# Patient Record
Sex: MALE | Race: WHITE | Employment: OTHER | URBAN - METROPOLITAN AREA
[De-identification: names, ages, dates, MRNs, and addresses within clinical notes are randomized per-mention and may not be internally consistent; named-entity substitution may affect disease eponyms.]

---

## 2022-10-26 ENCOUNTER — HOSPITAL ENCOUNTER (OUTPATIENT)
Dept: PHYSICAL THERAPY | Age: 82
Setting detail: THERAPIES SERIES
Discharge: HOME | End: 2022-10-26
Payer: MEDICARE

## 2022-10-26 DIAGNOSIS — R29.898 DECREASED STRENGTH OF LOWER EXTREMITY: ICD-10-CM

## 2022-10-26 DIAGNOSIS — M25.661 DECREASED RANGE OF MOTION OF BOTH KNEES: Primary | ICD-10-CM

## 2022-10-26 DIAGNOSIS — M25.461 EFFUSION OF BOTH KNEE JOINTS: ICD-10-CM

## 2022-10-26 DIAGNOSIS — M25.662 DECREASED RANGE OF MOTION OF BOTH KNEES: Primary | ICD-10-CM

## 2022-10-26 DIAGNOSIS — M25.462 EFFUSION OF BOTH KNEE JOINTS: ICD-10-CM

## 2022-10-26 PROCEDURE — 97530 THERAPEUTIC ACTIVITIES: CPT

## 2022-10-26 PROCEDURE — 97161 PT EVAL LOW COMPLEX 20 MIN: CPT

## 2022-10-26 NOTE — PLAN OF CARE
[address] [address], [address] [place] [address]  Phone: [phone]   Fax: [phone]                                                       Physical Therapy Certification    Dear Kim Aldana DO  ,    We had the pleasure of evaluating the following patient for physical therapy services at [address] [place] [address]. A summary of our findings can be found in the initial assessment below. This includes our plan of care. If you have any questions or concerns regarding these findings, please do not hesitate to contact me at the office phone number checked above. Thank you for the referral.       Physician Signature:_______________________________Date:__________________  By signing above (or electronic signature), therapists plan is approved by physician      Patient: mEilie Johnson   : 1940   MRN: [id number]  Referring Physician: Kim Aldana DO        Evaluation Date: 10/26/2022      Medical Diagnosis Information:  Primary OA Bilateral Knees M17. 0  No admission diagnoses are documented for this encounter. PT diagnosis:     ICD-10-CM    1. Decreased range of motion of both knees  M25.661     M25.662       2. Decreased strength of lower extremity  R29.898       3. Effusion of both knee joints  M25.461     M25.462                                              Insurance information: PT Insurance Information: [employer]     Precautions/ Contra-indications: None  Latex Allergy:  [x]NO      []YES  Preferred Language for Healthcare:   [x]English       []Other:    C-SSRS Triggered by Intake questionnaire (Past 2 wk assessment ):   [x] No, Questionnaire did not trigger screening.   [] Yes, Patient intake triggered C-SSRS Screening     [] Completed, no further action required.    [] Completed, PCP notified via Epic    SUBJECTIVE: Patient stated complaint: Pt will be getting Greg TKA November 15th with Dr. Kamar Brunner. Right pain worse than left. Having trouble with stairs, ladders, cycling. Reports cycling does not bother his knee, usually helps. Pain will wake him up in the middle of the night. Reports if he does not bike for a week his knee is in much more pain. TKA scheduled for November 15th    Relevant Medical History:Gel shots in knees. Functional Outcome: FOTO physical FS primary measure score = 44; Risk adjusted = 60    Pain Scale: 0-7/10  Easing factors: Cycling, maloxicam, volteran, Ice, tylenol. Provocative factors: Walking, stairs, sit to stand, ladders     Type: [] Constant   [x]Intermittent  []Radiating []Localized []other:     Numbness/Tingling: None    Occupation/School: Retired, [employer]    Living Status: Will you live with someone who can care for you after surgery? Yes, spouse and kids will visit right after surgery. Where is the bathroom located in your post-surgery place of residence? first   Where is the bedroom located in your post-surgery place of residence? first   Do you use community supports (home help, meals-on-wheels, district nurse)? [] 0-1 x/wk []  2+ x/wk   How may stairs will you have to climb to get in to your place of residence? 2   Have you had a fall in the past year? no    Prior Level of Function:Prior to this injury / incident, pt was independent with ADLs and IADLs,    Do you use ambulatory aids? How far on average can you walk? [x] 2 blocks+  [] 1-2 blocks  [] mostly house bound   What is you physical activity level? [x] highly active  []  Active  [] somewhat active  [] sedentary      OBJECTIVE:   Palpation: Joint effusion anterolateral right knee. Quad: Strong palpable contraction, sustained. Functional Mobility/Transfers: Squatting with decreased posterior weight shift    Posture: no deficits.      Bandages/Dressings/Incisions: None    Gait: (include devices/WB status) decreased terminal extension    Dermatomes Normal Abnormal Comments   inguinal area (L1)  x     anterior mid-thigh (L2) x     distal ant thigh/med knee (L3) x     medial lower leg and foot (L4) x     lateral lower leg and foot (L5) x     posterior calf (S1) x     medial calcaneus (S2) x         Myotomes Normal Abnormal Comments   Hip flexion (L1-L2) x     Knee extension (L2-L4) x     Dorsiflexion (L4-L5) x     Great Toe Ext (L5) x     Ankle Eversion (S1-S2) x     Ankle PF(S1-S2) x         Reflexes Normal Abnormal Comments   S1-2 Seated achilles      S1-2 Prone knee bend      L3-4 Patellar tendon x     Clonus      Babinski           PROM AROM    L R L R   Knee Flexion 125 117 122 115   Knee Extension   0-2 -1-0       Strength (0-5) Left Right   Hip Flexion - supine 5 5   Hip Flexion - seated     Hip Abduction - sidelyling 25.5# 28.2#   Hip Adduction 5 5   Hip Extension 5 5   Quads 58.8# 43.8#   Hamstrings 4 4        Flexibility     Hamstrings (90/90)     ITB (Reddy)     Quads (Ely's)     Hip Flexor Lizzeth Lenny)          Girth     Mid patella 37 40   Suprapatellar 37.5 41       Joint mobility: patellofemoral Superioinferior right   []Normal    [x]Hypo   []Hyper           Functional Testing  Sx Date 10/26/2022 Prehab Date 10/26/2022 Post-op Re-Eval Date  4 week F/U date  8 week F/U date  D/C Date      TUG (sec) 7.12       30 second sit to stand (reps) 15       6 minute walk (m) NT          Balance:    Narrowed MELISSA (sec) 10+       Semi Tandem (sec) 10+       Tandem (sec) 10+       SLS (sec) Left 10+, Right 7s          Knee AROM L R L R L R L R L R   Flexion 122 115           Extension 0-2 -1-0              Knee Ext: L R L R L R L R L R   MMT (of 5) 5 5           Knee Ext (#) 58.8 43.8              Hip Abd:  L R L R L R L R L R   MMT (of 5) 4+ 4+           Hip Abd (#) 25.5 28.2              LEFS (raw)  crosswalk 28.4                              [x] Patient history, allergies, meds reviewed. Medical chart reviewed. See intake form.      Review Of Systems (ROS):  [x]Performed Review of systems (Integumentary, CardioPulmonary, Neurological) by intake and observation. Intake form has been scanned into medical record. Patient has been instructed to contact their primary care physician regarding ROS issues if not already being addressed at this time.     Co-morbidities/Complexities (which will affect course of rehabilitation):   []None        []Hx of COVID   Arthritic conditions   []Rheumatoid arthritis (M05.9)  [x]Osteoarthritis (M19.91)  []Gout   Cardiovascular conditions   []Hypertension (I10)  []Hyperlipidemia (E78.5)  []Angina pectoris (I20)  []Atherosclerosis (I70)  []Pacemaker  []Hx of CABG/stent/  cardiac surgeries   Musculoskeletal conditions   []Disc pathology   []Congenital spine pathologies   []Osteoporosis (M81.8)  []Osteopenia (M85.8)  []Scoliosis       Endocrine conditions   []Hypothyroid (E03.9)  []Hyperthyroid Gastrointestinal conditions   []Constipation (U63.53)   Metabolic conditions   []Morbid obesity (E66.01)  []Diabetes type 1(E10.65) or 2 (E11.65)   []Neuropathy (G60.9)     Cardio/Pulmonary conditions   []Asthma (J45)  []Coughing   []COPD (J44.9)  []CHF  []A-fib   Psychological Disorders  []Anxiety (F41.9)  []Depression (F32.9)   []Other:   Developmental Disorders  []Autism (F84.0)  []CP (G80)  []Down Syndrome (Q90.9)  []Developmental delay     Neurological conditions  []Prior Stroke (I69.30)  []Parkinson's (G20)  []Encephalopathy (G93.40)  []MS (G35)  []Post-polio (G14)  []SCI  []TBI  []ALS Other conditions  []Fibromyalgia (M79.7)  []Vertigo  []Syncope  []Kidney Failure  []Cancer      []currently undergoing                treatment  []Pregnancy  []Incontinence   Prior surgeries  []involved limb  []previous spinal surgery  [] section birth  []hysterectomy  []bowel / bladder surgery  []other relevant surgeries   []Other:              Barriers to/and or personal factors that will affect rehab potential:              [x]Age  []Sex    []Smoker              []Motivation/Lack of Motivation                        []Co-Morbidities              []Cognitive Function, education/learning barriers              []Environmental, home barriers              []profession/work barriers  []past PT/medical experience  []other:  Justification:    Falls Risk Assessment (30 days):   [x] Falls Risk assessed and no intervention required. [] Falls Risk assessed and Patient requires intervention due to being higher risk   TUG score (>12s at risk):     [] Falls education provided, including       ASSESSMENT:Eric is an 80 y/o Male who presents to the clinic with PMH included bilateral knee osteoarthritis and for prehab evaluation (Greg TKA). Surgical procedure planned for November 15th. The patient complains of limitations including pain with prolonged walking (>1 mile), climbing ladder. Physical therapy evaluation revealed limitations in knee flexion range of motion bilaterally, joint effusion right, proximal/posterolateral hip weakness, inferior PF hypomobility that are limiting the patients ability to function in ADLs, further limiting their ability to participate in household and ADLs. Overall, physical therapy evaluation is consistent with referring diagnosis. The patient will benefit from skilled physical therapy interventions including progressive strengthening and HEP for prehab evaluation in order to improve functional and participation restrictions. This session focused on assessing outcomes pre-op, educating on post operative HEP and expectations. . Plan to continue PT for roughly 3 visits preoperatively for global strengthening. .     Functional Impairments:     []Noted lumbar/proximal hip/LE joint hypomobility   [x]Decreased LE functional ROM   [x]Decreased core/proximal hip strength and neuromuscular control   [x]Decreased LE functional strength   []Reduced balance/proprioceptive control   []other:      Functional Activity Limitations (from functional questionnaire and intake)   []Reduced ability to tolerate prolonged functional positions   []Reduced ability or difficulty with changes of positions or transfers between positions   []Reduced ability to maintain good posture and demonstrate good body mechanics with sitting, bending, and lifting   [x]Reduced ability to sleep   [] Reduced ability or tolerance with driving and/or computer work   []Reduced ability to perform lifting, carrying tasks   []Reduced ability to squat   []Reduced ability to forward bend   [x]Reduced ability to ambulate prolonged functional periods/distances/surfaces   [x]Reduced ability to ascend/descend stairs   []Reduced ability to run, hop, cut or jump   []other:    Participation Restrictions   [x]Reduced participation in self care activities   [x]Reduced participation in home management activities   []Reduced participation in work activities   []Reduced participation in social activities. []Reduced participation in sport/recreation activities. Classification :    [x]Signs/symptoms consistent with post-surgical status including decreased ROM, strength and function.    []Signs/symptoms consistent with joint sprain/strain   []Signs/symptoms consistent with patella-femoral syndrome   []Signs/symptoms consistent with knee OA/hip OA   []Signs/symptoms consistent with internal derangement of knee/Hip   []Signs/symptoms consistent with functional hip weakness/NMR control      []Signs/symptoms consistent with tendinitis/tendinosis    []signs/symptoms consistent with pathology which may benefit from Dry needling      []other:      Prognosis/Rehab Potential:      []Excellent   [x]Good    []Fair   []Poor    Tolerance of evaluation/treatment:    [x]Excellent   []Good    []Fair   []Poor    Physical Therapy Evaluation Complexity Justification  [x] A history of present problem with:  [] no personal factors and/or comorbidities that impact the plan of care;  [x]1-2 personal factors and/or comorbidities that impact the plan of care  []3 personal factors and/or comorbidities that impact the plan of care  [x] An examination of body systems using standardized tests and measures addressing any of the following: body structures and functions (impairments), activity limitations, and/or participation restrictions;:  [x] a total of 1-2 or more elements   [] a total of 3 or more elements   [] a total of 4 or more elements   [x] A clinical presentation with:  [x] stable and/or uncomplicated characteristics   [] evolving clinical presentation with changing characteristics  [] unstable and unpredictable characteristics;   [x] Clinical decision making of [x] low , [] moderate, [] high complexity using standardized patient assessment instrument and/or measurable assessment of functional outcome. [x] EVAL (LOW) 48637 (typically 30 minutes face-to-face)  [] EVAL (MOD) 37205 (typically 30 minutes face-to-face)  [] EVAL (HIGH) 70275 (typically 45 minutes face-to-face)  [] RE-EVAL     PLAN:   Frequency/Duration:  1-2 days per week for 3 Weeks Pre-op:  Interventions:  [x]  Therapeutic exercise including: strength training, ROM, for Lower extremity and core   [x]  NMR activation and proprioception for LE, Glutes and Core   [x]  Manual therapy as indicated for LE, Hip and spine to include: Dry Needling/IASTM, STM, PROM, Gr I-IV mobilizations, manipulation. [x] Modalities as needed that may include: thermal agents, E-stim, Biofeedback, US, iontophoresis as indicated  [x] Patient education on joint protection, postural re-education, activity modification, progression of HEP. HEP instruction: Pt provided with written HEP instructions. Patient education:  Patient was thoroughly educated on this date regarding prehabilitation goals, importance of PT sessions in improving overall ROM, strength and stability prior to surgery, and how prehabilitation will facilitate improved post-operative outcomes. The patient was educated on and instructed in HEP as listed.  The patient was given a detailed handout for exercises to initiate in the hospital post-operatively as well as at home. The discharge plan from the hospital was reviewed with the patient; specifically, to reduce length of hospital stay and to minimize time before reinitiating outpatient physical therapy after surgery. Education regarding early mobility post-operatively in the hospital and emphasis on working with both physical therapy and nursing staff to achieve ambulation goal of 150 feet was provided. The patient was highly encouraged to attend joint class in hospital prior to surgery for further instructions on pre and post-surgical care. Also, education regarding goals and expectations was provided; specifically, knee flexion range of motion greater than or equal to 90 degrees and 0 degrees knee extension to promote improved gait mechanics and ambulation. The patient was educated about all applicable post-op restrictions and precautions. The patient was encouraged to utilize ice/cold pack after surgery to address pain, minimize swelling as often as possible. It is in my medical opinion that this patient is clear from all physical barriers prior to consideration for surgery, activity modifications prior to and post operatively have been discussed with this patient as well as discharge planning and is cleared for surgery from physical therapy perspective. GOALS:  Patient stated goal: Ready for surgery  [] Progressing: [] Met: [] Not Met: [] Adjusted    Therapist goals for Patient:   Short Term Goals: To be achieved in: 1 visit  1. Independent in HEP and progression per patient tolerance, in order to prevent re-injury. [] Progressing: [] Met: [] Not Met: [] Adjusted  2. Patient will have a decrease in pain to facilitate improvement in movement, function, and ADLs as indicated by Functional Deficits. [] Progressing: [] Met: [] Not Met: [] Adjusted  3.  All patient questions regarding expectations for rehab following upcoming surgery are answered.    [] Progressing: [] Met: [] Not Met: [] Adjusted    Long Term Goals: long term goals to be determined at re-eval following upcoming surgery    Electronically signed by:  Ale Noel PT DPT, MS

## 2022-10-26 NOTE — FLOWSHEET NOTE
[place] [address] [facility]  Phone: [phone] Fax: [phone]    Physical Therapy Treatment Note/ Progress Report:       Date:  10/26/2022    Patient Name:  Stephanie Camacho    :  1940  MRN: [id number]  Restrictions/Precautions:    Medical/Treatment Diagnosis Information:  Diagnosis: Bilateral Knee Osteoarthritis (M17. 0)       ICD-10-CM    1. Decreased range of motion of both knees  M25.661     M25.662       2. Decreased strength of lower extremity  R29.898       3.  Effusion of both knee joints  M25.461     M25.462           Insurance/Certification information:  PT Insurance Information: [employer]  Physician Information:  Carol Connor DO  Plan of care signed (Y/N):     Date of Patient follow up with Physician:      Progress Report: []  Yes  []  No     Date Range for reporting period:  Beginning: 10/26/2022  Ending:     Progress report due (10 Rx/or 30 days whichever is less):      Recertification due (POC duration/ or 90 days whichever is less): 2022     Visit # Insurance Allowable Auth Needed   1 MN [x]Yes    []No     Latex Allergy:  [x]NO      []YES  Preferred Language for Healthcare:   [x]English       []other:  Functional Scale: FOTO Primary Measure   Date assessed:10/26/2022    Pain level:  2-3/10     SUBJECTIVE:  See eval    OBJECTIVE: See eval  Observation:   Test measurements:      RESTRICTIONS/PRECAUTIONS: None    Exercises/Interventions:     Therapeutic Ex (09105)   Min: Sets/sec Reps Notes/CUES   Retro Stepper/BIKE      Alter G      BFR      Sportcord March      3 way SLR      SAQ      Clam ABD      Hip Ext Wil Forte      BOSU fwd/side lunge      BOSU squat      Leg Press Iso/Con/Ecc 0-      Cybex HS curl      TKE      Glute side walks      RDL      Slide Lunge      Slide HS eccentrics      Step ups/ecc step down      Swissball wall rolls- in SLS- hip drive      Quad hip ext/wall-ball rolls Manual Intervention (77741)  Min:      Knee mobs/PROM      Tib/Fem Mobs      Patella Mobs  5' Right inferior   Ankle mobs                  NMR re-education (40017)  Min:   CUES NEEDED   Kosovan/Biofeedback 10/10      BFR      G. Med activation      Hip Ext full ROM/ G. Activation      Bosu Bal and Prop- G Med      Single leg stance/Balance/Prop      Bosu Retro G. Med act      Prone Hip froggers- sliders/elevated            Therapeutic Activity (75868)  Min:      Ladders      Plyos      Dynamic Balance      Edu: Post op procedures, DVT/Infection S/S, HEP post operatively, prehab. Expectation post operatively. 10'                     Therapeutic Exercise and NMR EXR  [x] (76227) Provided verbal/tactile cueing for activities related to strengthening, flexibility, endurance, ROM for improvements in LE, proximal hip, and core control with self care, mobility, lifting, ambulation. [x] (99500) Provided verbal/tactile cueing for activities related to improving balance, coordination, kinesthetic sense, posture, motor skill, proprioception  to assist with LE, proximal hip, and core control in self care, mobility, lifting, ambulation and eccentric single leg control.      NMR and Therapeutic Activities:    [x] (86175 or 24386) Provided verbal/tactile cueing for activities related to improving balance, coordination, kinesthetic sense, posture, motor skill, proprioception and motor activation to allow for proper function of core, proximal hip and LE with self care and ADLs and functional mobility.   [] (97977) Gait Re-education- Provided training and instruction to the patient for proper LE, core and proximal hip recruitment and positioning and eccentric body weight control with ambulation re-education including up and down stairs     Home Exercise Program:    [x] (67684) Reviewed/Progressed HEP activities related to strengthening, flexibility, endurance, ROM of core, proximal hip and LE for functional self-care, mobility, lifting and ambulation/stair navigation   [] (29366)Reviewed/Progressed HEP activities related to improving balance, coordination, kinesthetic sense, posture, motor skill, proprioception of core, proximal hip and LE for self care, mobility, lifting, and ambulation/stair navigation      Manual Treatments:  PROM / STM / Oscillations-Mobs:  G-I, II, III, IV (PA's, Inf., Post.)  [] (73058) Provided manual therapy to mobilize LE, proximal hip and/or LS spine soft tissue/joints for the purpose of modulating pain, promoting relaxation,  increasing ROM, reducing/eliminating soft tissue swelling/inflammation/restriction, improving soft tissue extensibility and allowing for proper ROM for normal function with self care, mobility, lifting and ambulation. Modalities:     [] GAME READY (VASO)- for significant edema, swelling, pain control. Charges:  Timed Code Treatment Minutes: 15   Total Treatment Minutes: 45      [x] EVAL (LOW) 11986 (typically 20 minutes face-to-face)  [] EVAL (MOD) 71520 (typically 30 minutes face-to-face)  [] EVAL (HIGH) 11043 (typically 45 minutes face-to-face)  [] RE-EVAL     [] GX(88770) x     [] DRY NEEDLE 1 OR 2 MUSCLES  [] NMR (49393) x     [] DRY NEEDLE 3+ MUSCLES  [] Manual (56408) x       [x] TA (69641) x   1  [] Mech Traction (75712)  [] ES(attended) (79918)     [] ES (un) (60878):   [] VASO (52780)  [] Other:    If [facility] Please Indicate Time In/Out  CPT Code Time in Time out                                   GOALS:  Patient stated goal: Ready for surgery  [] Progressing: [] Met: [] Not Met: [] Adjusted     Therapist goals for Patient:   Short Term Goals: To be achieved in: 1 visit  1. Independent in HEP and progression per patient tolerance, in order to prevent re-injury. [] Progressing: [] Met: [] Not Met: [] Adjusted  2. Patient will have a decrease in pain to facilitate improvement in movement, function, and ADLs as indicated by Functional Deficits.   [] Progressing: [] Met: [] Not Met: [] Adjusted  3. All patient questions regarding expectations for rehab following upcoming surgery are answered. [] Progressing: [] Met: [] Not Met: [] Adjusted     Long Term Goals: long term goals to be determined at re-eval following upcoming surgery       ASSESSMENT:  See eval    Return to Play: (if applicable)   []  Stage 1: Intro to Strength   []  Stage 2: Return to Run and Strength   []  Stage 3: Return to Jump and Strength   []  Stage 4: Dynamic Strength and Agility   []  Stage 5: Sport Specific Training     []  Ready to Return to Play, Meets All Above Stages   []  Not Ready for Return to Sports   Comments:            Treatment/Activity Tolerance:  [x] Patient tolerated treatment well [] Patient limited by fatique  [] Patient limited by pain  [] Patient limited by other medical complications  [] Other:     Overall Progression Towards Functional goals/ Treatment Progress Update:  [] Patient is progressing as expected towards functional goals listed. [] Progression is slowed due to complexities/Impairments listed. [] Progression has been slowed due to co-morbidities. [x] Plan just implemented, too soon to assess goals progression <30days   [] Goals require adjustment due to lack of progress  [] Patient is not progressing as expected and requires additional follow up with physician  [] Other    Prognosis for POC: [x] Good [] Fair  [] Poor    Patient requires continued skilled intervention: [x] Yes  [] No        PLAN: See eval  [] Continue per plan of care [] Alter current plan (see comments)  [x] Plan of care initiated [] Hold pending MD visit [] Discharge    Electronically signed by: Cosmo Sheldon PT DPT, MS    Note: If patient does not return for scheduled/recommended follow up visits, this note will serve as a discharge from care along with the most recent update on progress.

## 2022-11-04 ENCOUNTER — APPOINTMENT (OUTPATIENT)
Dept: PHYSICAL THERAPY | Age: 82
End: 2022-11-04
Payer: MEDICARE

## 2022-11-11 ENCOUNTER — HOSPITAL ENCOUNTER (OUTPATIENT)
Dept: PHYSICAL THERAPY | Age: 82
Setting detail: THERAPIES SERIES
Discharge: HOME | End: 2022-11-11
Payer: MEDICARE

## 2022-11-11 PROCEDURE — 97110 THERAPEUTIC EXERCISES: CPT

## 2022-11-11 NOTE — FLOWSHEET NOTE
[place] [address] [place] [address]  Phone: [phone] Fax: [phone]    Physical Therapy Treatment Note/ Progress Report:       Date:  2022    Patient Name:  Erika Curry    :  1940  MRN: [id number]  Restrictions/Precautions:    Medical/Treatment Diagnosis Information:  Diagnosis: Bilateral Knee Osteoarthritis (M17. 0)       ICD-10-CM    1. Decreased range of motion of both knees  M25.661     M25.662       2. Decreased strength of lower extremity  R29.898       3. Effusion of both knee joints  M25.461     M25.462           Insurance/Certification information:  PT Insurance Information: [employer]  Physician Information:  Sapna Richards DO  Plan of care signed (Y/N):     Date of Patient follow up with Physician:      Progress Report: []  Yes  []  No     Date Range for reporting period:  Beginning: 10/26/2022  Ending:     Progress report due (10 Rx/or 30 days whichever is less):      Recertification due (POC duration/ or 90 days whichever is less): 2022     Visit # Insurance Allowable Auth Needed   2 MN [x]Yes    []No     Latex Allergy:  [x]NO      []YES  Preferred Language for Healthcare:   [x]English       []other:  Functional Scale: FOTO Primary Measure   Date assessed:10/26/2022    Pain level:  /10     SUBJECTIVE:  Pt reports that both of his knees are stiff and a little swollen this morning. Reports that he is all good for surgery on Tuesday. Pt notes that he has home therapy set up for two weeks after surgery. OBJECTIVE: See eval  Observation:   Test measurements:      RESTRICTIONS/PRECAUTIONS: None    Exercises/Interventions:    All exercises performed bilaterally  Therapeutic Ex (71443)   Min: Sets/sec Reps Notes/CUES   Retro Stepper/BIKE 5 min     SLR flexion 2 15    SLR ABD 2 15    Standing hip extension 2 15 Green TB   Supine clamshells 2 20 Green TB   SAQ 2 15    LAQ isometric 10 sec 3 Heel raises 2 15    Supine heel slides 1 3    BOSU squat      Leg Press Iso/Con/Ecc 0-      Cybex HS curl      TKE      Glute side walks      RDL      Slide Lunge      Slide HS eccentrics      Step ups/ecc step down      Swissball wall rolls- in SLS- hip drive      Quad hip ext/wall-ball rolls                  Manual Intervention (57904)  Min:      Knee mobs/PROM      Tib/Fem Mobs      Patella Mobs  5' Right inferior   Ankle mobs                  NMR re-education (22736)  Min:   CUES NEEDED   Gu[place]/Biofeedback 10/10      BFR      G. Med activation      Hip Ext full ROM/ G. Activation      Bosu Bal and Prop- G Med      Single leg stance/Balance/Prop      Bosu Retro G. Med act      Prone Hip froggers- sliders/elevated            Therapeutic Activity (24955)  Min:      Ladders      Plyos      Dynamic Balance      Education about expectations post-op, HEP 5'                     Therapeutic Exercise and NMR EXR  [x] (82803) Provided verbal/tactile cueing for activities related to strengthening, flexibility, endurance, ROM for improvements in LE, proximal hip, and core control with self care, mobility, lifting, ambulation. [x] (62366) Provided verbal/tactile cueing for activities related to improving balance, coordination, kinesthetic sense, posture, motor skill, proprioception  to assist with LE, proximal hip, and core control in self care, mobility, lifting, ambulation and eccentric single leg control.      NMR and Therapeutic Activities:    [x] (18526 or 56007) Provided verbal/tactile cueing for activities related to improving balance, coordination, kinesthetic sense, posture, motor skill, proprioception and motor activation to allow for proper function of core, proximal hip and LE with self care and ADLs and functional mobility.   [] (29810) Gait Re-education- Provided training and instruction to the patient for proper LE, core and proximal hip recruitment and positioning and eccentric body weight control with ambulation re-education including up and down stairs     Home Exercise Program:    [x] (07240) Reviewed/Progressed HEP activities related to strengthening, flexibility, endurance, ROM of core, proximal hip and LE for functional self-care, mobility, lifting and ambulation/stair navigation   [] (20813)Reviewed/Progressed HEP activities related to improving balance, coordination, kinesthetic sense, posture, motor skill, proprioception of core, proximal hip and LE for self care, mobility, lifting, and ambulation/stair navigation      Manual Treatments:  PROM / STM / Oscillations-Mobs:  G-I, II, III, IV (PA's, Inf., Post.)  [] (62945) Provided manual therapy to mobilize LE, proximal hip and/or LS spine soft tissue/joints for the purpose of modulating pain, promoting relaxation,  increasing ROM, reducing/eliminating soft tissue swelling/inflammation/restriction, improving soft tissue extensibility and allowing for proper ROM for normal function with self care, mobility, lifting and ambulation. Modalities:     [] GAME READY (VASO)- for significant edema, swelling, pain control. Charges:  Timed Code Treatment Minutes: 41   Total Treatment Minutes: 41      [] EVAL (LOW) 58147 (typically 20 minutes face-to-face)  [] EVAL (MOD) 02730 (typically 30 minutes face-to-face)  [] EVAL (HIGH) 49119 (typically 45 minutes face-to-face)  [] RE-EVAL     [x] DT(55421) x 3    [] DRY NEEDLE 1 OR 2 MUSCLES  [] NMR (53958) x     [] DRY NEEDLE 3+ MUSCLES  [] Manual (64666) x       [] TA (20348) x     [] Mech Traction (67615)  [] ES(attended) (38724)     [] ES (un) (27087):   [] VASO (56086)  [] Other:    If [facility] Please Indicate Time In/Out  CPT Code Time in Time out                                   GOALS:  Patient stated goal: Ready for surgery  [] Progressing: [] Met: [] Not Met: [] Adjusted     Therapist goals for Patient:   Short Term Goals: To be achieved in: 1 visit  1.  Independent in HEP and progression per patient tolerance, in order to prevent re-injury. [] Progressing: [] Met: [] Not Met: [] Adjusted  2. Patient will have a decrease in pain to facilitate improvement in movement, function, and ADLs as indicated by Functional Deficits. [] Progressing: [] Met: [] Not Met: [] Adjusted  3. All patient questions regarding expectations for rehab following upcoming surgery are answered. [] Progressing: [] Met: [] Not Met: [] Adjusted     Long Term Goals: long term goals to be determined at re-eval following upcoming surgery       ASSESSMENT: Pt tolerated treatment session well today. Focus of session today on education and practice of HEP prior to bilateral TKA on Tuesday. All exercises performed bilaterally during session. Importance of pain control to assist with improving motion and quadriceps control was discussed for post-surgery. Pt will continue to benefit post-surgery to improve strength, ROM, pain, endurance, balance, and neuromuscular control to return to PLOF without symptoms or restrictions. Return to Play: (if applicable)   []  Stage 1: Intro to Strength   []  Stage 2: Return to Run and Strength   []  Stage 3: Return to Jump and Strength   []  Stage 4: Dynamic Strength and Agility   []  Stage 5: Sport Specific Training     []  Ready to Return to Play, Meets All Above Stages   []  Not Ready for Return to Sports   Comments:            Treatment/Activity Tolerance:  [x] Patient tolerated treatment well [] Patient limited by fatique  [] Patient limited by pain  [] Patient limited by other medical complications  [] Other:     Overall Progression Towards Functional goals/ Treatment Progress Update:  [] Patient is progressing as expected towards functional goals listed. [] Progression is slowed due to complexities/Impairments listed. [] Progression has been slowed due to co-morbidities.   [x] Plan just implemented, too soon to assess goals progression <30days   [] Goals require adjustment due to lack of progress  [] Patient is not progressing as expected and requires additional follow up with physician  [] Other    Prognosis for POC: [x] Good [] Fair  [] Poor    Patient requires continued skilled intervention: [x] Yes  [] No        PLAN: See eval  [] Continue per plan of care [] Alter current plan (see comments)  [x] Plan of care initiated [] Hold pending MD visit [] Discharge    Electronically signed by: Elvia Whalen PT DPT,    Note: If patient does not return for scheduled/recommended follow up visits, this note will serve as a discharge from care along with the most recent update on progress.